# Patient Record
Sex: MALE | Race: WHITE
[De-identification: names, ages, dates, MRNs, and addresses within clinical notes are randomized per-mention and may not be internally consistent; named-entity substitution may affect disease eponyms.]

---

## 2018-01-17 ENCOUNTER — HOSPITAL ENCOUNTER (INPATIENT)
Dept: HOSPITAL 88 - ER | Age: 63
LOS: 1 days | Discharge: HOME | DRG: 181 | End: 2018-01-18
Attending: INTERNAL MEDICINE | Admitting: INTERNAL MEDICINE
Payer: COMMERCIAL

## 2018-01-17 VITALS — SYSTOLIC BLOOD PRESSURE: 124 MMHG | DIASTOLIC BLOOD PRESSURE: 77 MMHG

## 2018-01-17 VITALS — DIASTOLIC BLOOD PRESSURE: 90 MMHG | SYSTOLIC BLOOD PRESSURE: 147 MMHG

## 2018-01-17 VITALS — DIASTOLIC BLOOD PRESSURE: 71 MMHG | SYSTOLIC BLOOD PRESSURE: 135 MMHG

## 2018-01-17 VITALS — BODY MASS INDEX: 23.98 KG/M2 | WEIGHT: 177 LBS | HEIGHT: 72 IN

## 2018-01-17 VITALS — DIASTOLIC BLOOD PRESSURE: 91 MMHG | SYSTOLIC BLOOD PRESSURE: 155 MMHG

## 2018-01-17 VITALS — SYSTOLIC BLOOD PRESSURE: 155 MMHG | DIASTOLIC BLOOD PRESSURE: 91 MMHG

## 2018-01-17 DIAGNOSIS — C09.9: ICD-10-CM

## 2018-01-17 DIAGNOSIS — C78.02: Primary | ICD-10-CM

## 2018-01-17 DIAGNOSIS — C79.31: ICD-10-CM

## 2018-01-17 DIAGNOSIS — Z87.891: ICD-10-CM

## 2018-01-17 DIAGNOSIS — C79.51: ICD-10-CM

## 2018-01-17 DIAGNOSIS — C78.01: ICD-10-CM

## 2018-01-17 DIAGNOSIS — J90: ICD-10-CM

## 2018-01-17 LAB
ALBUMIN SERPL-MCNC: 2.8 G/DL (ref 3.5–5)
ALBUMIN/GLOB SERPL: 0.7 {RATIO} (ref 0.8–2)
ALP SERPL-CCNC: 136 IU/L (ref 40–150)
ALT SERPL-CCNC: 38 IU/L (ref 0–55)
ANION GAP SERPL CALC-SCNC: 13.9 MMOL/L (ref 8–16)
BACTERIA URNS QL MICRO: (no result) /HPF
BASOPHILS # BLD AUTO: 0 10*3/UL (ref 0–0.1)
BASOPHILS NFR BLD AUTO: 0.3 % (ref 0–1)
BILIRUB UR QL: NEGATIVE
BNP BLD-MCNC: 115 PG/ML (ref 0–100)
BUN SERPL-MCNC: 19 MG/DL (ref 7–26)
BUN/CREAT SERPL: 28 (ref 6–25)
CALCIUM SERPL-MCNC: 11.6 MG/DL (ref 8.4–10.2)
CHLORIDE SERPL-SCNC: 86 MMOL/L (ref 98–107)
CK MB SERPL-MCNC: 1.1 NG/ML (ref 0–5)
CK MB SERPL-MCNC: 2.1 NG/ML (ref 0–5)
CK MB SERPL-MCNC: 2.2 NG/ML (ref 0–5)
CK SERPL-CCNC: 237 IU/L (ref 30–200)
CK SERPL-CCNC: 237 IU/L (ref 30–200)
CK SERPL-CCNC: 257 IU/L (ref 30–200)
CLARITY UR: (no result)
CO2 SERPL-SCNC: 30 MMOL/L (ref 22–29)
COLOR UR: YELLOW
DEPRECATED APTT PLAS QN: 32.2 SECONDS (ref 23.8–35.5)
DEPRECATED INR PLAS: 0.94
DEPRECATED NEUTROPHILS # BLD AUTO: 9.7 10*3/UL (ref 2.1–6.9)
DEPRECATED RBC URNS MANUAL-ACNC: (no result) /HPF (ref 0–5)
EGFRCR SERPLBLD CKD-EPI 2021: > 60 ML/MIN (ref 60–?)
EOSINOPHIL # BLD AUTO: 0.1 10*3/UL (ref 0–0.4)
EOSINOPHIL NFR BLD AUTO: 0.7 % (ref 0–6)
EPI CELLS URNS QL MICRO: (no result) /LPF
ERYTHROCYTE [DISTWIDTH] IN CORD BLOOD: 15.7 % (ref 11.7–14.4)
GLOBULIN PLAS-MCNC: 4.1 G/DL (ref 2.3–3.5)
GLUCOSE SERPLBLD-MCNC: 110 MG/DL (ref 74–118)
HCT VFR BLD AUTO: 32.9 % (ref 38.2–49.6)
HGB BLD-MCNC: 10.8 G/DL (ref 14–18)
KETONES UR QL STRIP.AUTO: NEGATIVE
LEUKOCYTE ESTERASE UR QL STRIP.AUTO: NEGATIVE
LIPASE SERPL-CCNC: 180 U/L (ref 8–78)
LYMPHOCYTES # BLD: 0.5 10*3/UL (ref 1–3.2)
LYMPHOCYTES NFR BLD AUTO: 4.6 % (ref 18–39.1)
MCH RBC QN AUTO: 30.9 PG (ref 28–32)
MCHC RBC AUTO-ENTMCNC: 32.8 G/DL (ref 31–35)
MCV RBC AUTO: 94.3 FL (ref 81–99)
MONOCYTES # BLD AUTO: 1.1 10*3/UL (ref 0.2–0.8)
MONOCYTES NFR BLD AUTO: 9.5 % (ref 4.4–11.3)
NEUTS SEG NFR BLD AUTO: 84.2 % (ref 38.7–80)
NITRITE UR QL STRIP.AUTO: NEGATIVE
PLATELET # BLD AUTO: 181 X10E3/UL (ref 140–360)
POTASSIUM SERPL-SCNC: 3.9 MMOL/L (ref 3.5–5.1)
PROT UR QL STRIP.AUTO: (no result)
PROTHROMBIN TIME: 13 SECONDS (ref 11.9–14.5)
RBC # BLD AUTO: 3.49 X10E6/UL (ref 4.3–5.7)
SODIUM SERPL-SCNC: 126 MMOL/L (ref 136–145)
SP GR UR STRIP: 1.02 (ref 1.01–1.02)
TROPONIN I SERPL DL<=0.01 NG/ML-MCNC: 0.01 NG/ML (ref 0–0.3)
TROPONIN I SERPL DL<=0.01 NG/ML-MCNC: 0.02 NG/ML (ref 0–0.3)
TROPONIN I SERPL DL<=0.01 NG/ML-MCNC: 0.02 NG/ML (ref 0–0.3)
UROBILINOGEN UR STRIP-MCNC: 0.2 MG/DL (ref 0.2–1)
WBC #/AREA URNS HPF: (no result) /HPF (ref 0–5)

## 2018-01-17 PROCEDURE — 94644 CONT INHLJ TX 1ST HOUR: CPT

## 2018-01-17 PROCEDURE — 87070 CULTURE OTHR SPECIMN AEROBIC: CPT

## 2018-01-17 PROCEDURE — 83880 ASSAY OF NATRIURETIC PEPTIDE: CPT

## 2018-01-17 PROCEDURE — 80053 COMPREHEN METABOLIC PANEL: CPT

## 2018-01-17 PROCEDURE — 81001 URINALYSIS AUTO W/SCOPE: CPT

## 2018-01-17 PROCEDURE — 84484 ASSAY OF TROPONIN QUANT: CPT

## 2018-01-17 PROCEDURE — 93005 ELECTROCARDIOGRAM TRACING: CPT

## 2018-01-17 PROCEDURE — 87400 INFLUENZA A/B EACH AG IA: CPT

## 2018-01-17 PROCEDURE — 99285 EMERGENCY DEPT VISIT HI MDM: CPT

## 2018-01-17 PROCEDURE — 87086 URINE CULTURE/COLONY COUNT: CPT

## 2018-01-17 PROCEDURE — 94640 AIRWAY INHALATION TREATMENT: CPT

## 2018-01-17 PROCEDURE — 83690 ASSAY OF LIPASE: CPT

## 2018-01-17 PROCEDURE — 85025 COMPLETE CBC W/AUTO DIFF WBC: CPT

## 2018-01-17 PROCEDURE — 83605 ASSAY OF LACTIC ACID: CPT

## 2018-01-17 PROCEDURE — 84157 ASSAY OF PROTEIN OTHER: CPT

## 2018-01-17 PROCEDURE — 80048 BASIC METABOLIC PNL TOTAL CA: CPT

## 2018-01-17 PROCEDURE — 74470 X-RAY EXAM OF KIDNEY LESION: CPT

## 2018-01-17 PROCEDURE — 82945 GLUCOSE OTHER FLUID: CPT

## 2018-01-17 PROCEDURE — 71010: CPT

## 2018-01-17 PROCEDURE — 88112 CYTOPATH CELL ENHANCE TECH: CPT

## 2018-01-17 PROCEDURE — 32555 ASPIRATE PLEURA W/ IMAGING: CPT

## 2018-01-17 PROCEDURE — 87205 SMEAR GRAM STAIN: CPT

## 2018-01-17 PROCEDURE — 85730 THROMBOPLASTIN TIME PARTIAL: CPT

## 2018-01-17 PROCEDURE — 88305 TISSUE EXAM BY PATHOLOGIST: CPT

## 2018-01-17 PROCEDURE — 71260 CT THORAX DX C+: CPT

## 2018-01-17 PROCEDURE — 85610 PROTHROMBIN TIME: CPT

## 2018-01-17 PROCEDURE — 36415 COLL VENOUS BLD VENIPUNCTURE: CPT

## 2018-01-17 PROCEDURE — 82550 ASSAY OF CK (CPK): CPT

## 2018-01-17 PROCEDURE — 83615 LACTATE (LD) (LDH) ENZYME: CPT

## 2018-01-17 PROCEDURE — 89051 BODY FLUID CELL COUNT: CPT

## 2018-01-17 PROCEDURE — 82553 CREATINE MB FRACTION: CPT

## 2018-01-17 RX ADMIN — METOCLOPRAMIDE SCH MG: 10 TABLET ORAL at 22:31

## 2018-01-17 RX ADMIN — IPRATROPIUM BROMIDE AND ALBUTEROL SULFATE SCH ML: .5; 2.5 SOLUTION RESPIRATORY (INHALATION) at 23:45

## 2018-01-17 RX ADMIN — SODIUM CHLORIDE SCH GM: 9 INJECTION, SOLUTION INTRAVENOUS at 19:33

## 2018-01-17 RX ADMIN — METOCLOPRAMIDE SCH MG: 10 TABLET ORAL at 14:32

## 2018-01-17 RX ADMIN — DEXAMETHASONE SCH MG: 4 TABLET ORAL at 10:33

## 2018-01-17 RX ADMIN — Medication SCH MG: at 15:06

## 2018-01-17 RX ADMIN — Medication PRN MG: at 09:20

## 2018-01-17 RX ADMIN — Medication SCH MG: at 20:04

## 2018-01-17 RX ADMIN — MORPHINE SULFATE PRN MG: 15 TABLET ORAL at 20:04

## 2018-01-17 RX ADMIN — IPRATROPIUM BROMIDE AND ALBUTEROL SULFATE SCH ML: .5; 2.5 SOLUTION RESPIRATORY (INHALATION) at 10:55

## 2018-01-17 RX ADMIN — IPRATROPIUM BROMIDE AND ALBUTEROL SULFATE SCH ML: .5; 2.5 SOLUTION RESPIRATORY (INHALATION) at 07:30

## 2018-01-17 RX ADMIN — IPRATROPIUM BROMIDE AND ALBUTEROL SULFATE SCH ML: .5; 2.5 SOLUTION RESPIRATORY (INHALATION) at 15:00

## 2018-01-17 RX ADMIN — IPRATROPIUM BROMIDE AND ALBUTEROL SULFATE SCH ML: .5; 2.5 SOLUTION RESPIRATORY (INHALATION) at 19:23

## 2018-01-17 RX ADMIN — MORPHINE SULFATE PRN MG: 15 TABLET ORAL at 15:06

## 2018-01-17 RX ADMIN — Medication PRN MG: at 16:46

## 2018-01-17 NOTE — CONSULTATION
DATE OF CONSULTATION:  January 17, 2018 



HEMATOLOGY/ONCOLOGY CONSULTATION



Consultation requested by Dr. Borjas and Dr. Rodrigues.



REASON FOR CONSULTATION:  Metastatic malignancy.



Thank you very kindly, Dr. Borjas and Dr. Rodrigues, for letting me 

participate in the care of this 62-year-old male. He was a rather poor 

historian. Dr. Rodrigues was kind enough to provide me details that he 

initially was diagnosed with carcinoma of the tonsillar area which was 

resected. He declined to have postsurgical treatments. Subsequently he 

relapsed rather quickly with bilateral pulmonary nodules and pleural 

effusion. Additionally, it appears that he also had brain and skeletal 

mets. He has been under the care of Dr. Sherri Kothari at [facility] in the [facility]. He had only received radiation, according to 

the patient, and was to start on some systemic therapy this week but became 

short of breath and came over to the emergency room and was admitted to the 

hospital. There was no history of fever. System review is positive for 

cough, shortness of breath, weakness, back pain. Additional details of the 

history are documented in the old chart.



PHYSICAL EXAMINATION

GENERAL:  Revealed an acutely and chronically ill-looking male.

VITAL SIGNS:  As recorded in the chart.

HEENT:  Conjunctivae are a little pale. There is no icterus.

NECK:  There is no palpable peripheral adenopathy. 

LUNGS:  Examination reveals prolonged expiration, decreased air entry in 

the right lower zones.

HEART:  Size appears within normal limits.

ABDOMEN:  Without visceromegaly. 

NEUROLOGIC:  No focal neurological deficit is noted.



LABORATORY STUDIES:  Reveal a mild degree of anemia. Serum sodium is low at 

126. Renal function is normal. 



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 

tonsillar. 



Current problems include shortness of breath which may be improved with 

thoracentesis on the right side. This was discussed with the patient and 

with Dr. Rodrigues, who was in agreement. The patient previously had 

thoracentesis on the left side with relief of shortness of breath. 

Subsequently he wants to return back to his regular oncologist for further 

followup care.





 





DD:  01/17/2018 13:43

DT:  01/17/2018 15:41

Job#:  [id number]

## 2018-01-17 NOTE — DIAGNOSTIC IMAGING REPORT
EXAMINATION:  CHEST SINGLE (PORTABLE)    



INDICATION:           Shortness of breath 



COMPARISON:  None

     

FINDINGS:

TUBES and LINES:  Right upper extremity PICC line with tip overlying the atrial

caval junction



LUNGS:  Lungs are not well inflated.  There are bibasilar atelectasis.  

Confluent areas of nodular opacities compatible with metastasis of

indeterminate origin. Superimposed pneumonia is suspected



PLEURA:  No pleural effusion or pneumothorax.



HEART AND MEDIASTINUM:  The cardiomediastinal silhouette is unremarkable.    



BONES AND SOFT TISSUES:  No acute osseous lesion.  Soft tissues are

unremarkable.



UPPER ABDOMEN: No free air under the diaphragm.    



IMPRESSION: 

Findings in the chest are suspicious for metastatic disease with superimposed

infection. CT of chest with IV contrast is recommended





Signed by: Dr. Toby Mejía M.D. on 1/17/2018 1:08 AM

## 2018-01-17 NOTE — DIAGNOSTIC IMAGING REPORT
EXAM: CT Chest WITH contrast 1/17/2018 12:43 AM

INDICATION: Difficulty breathing 

COMPARISON: Chest x-ray on 1/17/2018

TECHNIQUE:

Chest was scanned utilizing a multidetector helical scanner from the lung apex

through the level of the adrenal glands without administration of IV contrast.

Coronal and sagittal reformations were obtained. Pulmonary embolism protocol

was performed.

           IV CONTRAST: 100 mL of Isovue-370

           RADIATION DOSE: Total DLP: 590.26 mGy*cm

            Estimated effective dose: (DLP x 0.014 x size factor) mSv

           COMPLICATIONS: None



FINDINGS:



LINES/ TUBES: None.



LUNGS AND AIRWAYS:  Innumerable bilateral pulmonary nodules and confluent

masses, the largest in the right hilar region measuring 7.6 cm in diameter on

series 2, image 59. Airways are normal.



PLEURA: Moderate right pleural effusion.



HEART AND MEDIASTINUM: The thyroid gland is normal.  The lymphadenopathy noted

in the subcarinal, prevascular and bilateral hilar regions.  The heart is

normal in size.. There is a small pericardial effusion.  There are mild

atherosclerotic calcifications in the aorta and coronary arteries.



UPPER ABDOMEN: Limited non-contrast views of the upper abdomen show no

abnormality within the visualized liver, spleen, pancreas, or kidneys. The

adrenal glands are normal.



BONES: There are multiple lytic lesions in the spine involving predominantly T5

and T11



SOFT TISSUES: Unremarkable.



IMPRESSION: 

1.  Metastatic disease involving the bilateral lung parenchyma, hilar and

mediastinal adenopathy and T5 and T11 bone metastasis. Biopsy is recommended.





Signed by: Dr. Toby Mejía M.D. on 1/17/2018 3:40 AM
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
## 2018-01-18 VITALS — SYSTOLIC BLOOD PRESSURE: 113 MMHG | DIASTOLIC BLOOD PRESSURE: 81 MMHG

## 2018-01-18 VITALS — DIASTOLIC BLOOD PRESSURE: 86 MMHG | SYSTOLIC BLOOD PRESSURE: 135 MMHG

## 2018-01-18 VITALS — SYSTOLIC BLOOD PRESSURE: 118 MMHG | DIASTOLIC BLOOD PRESSURE: 78 MMHG

## 2018-01-18 VITALS — SYSTOLIC BLOOD PRESSURE: 156 MMHG | DIASTOLIC BLOOD PRESSURE: 82 MMHG

## 2018-01-18 VITALS — SYSTOLIC BLOOD PRESSURE: 143 MMHG | DIASTOLIC BLOOD PRESSURE: 83 MMHG

## 2018-01-18 VITALS — DIASTOLIC BLOOD PRESSURE: 93 MMHG | SYSTOLIC BLOOD PRESSURE: 161 MMHG

## 2018-01-18 LAB
ANION GAP SERPL CALC-SCNC: 12.5 MMOL/L (ref 8–16)
APPEARANCE FLD: (no result)
BASOPHILS # BLD AUTO: 0 10*3/UL (ref 0–0.1)
BASOPHILS NFR BLD AUTO: 0.2 % (ref 0–1)
BUN SERPL-MCNC: 22 MG/DL (ref 7–26)
BUN/CREAT SERPL: 37 (ref 6–25)
CALCIUM SERPL-MCNC: 11.4 MG/DL (ref 8.4–10.2)
CHLORIDE SERPL-SCNC: 93 MMOL/L (ref 98–107)
CO2 SERPL-SCNC: 33 MMOL/L (ref 22–29)
COLOR FLD: YELLOW
DEPRECATED LYMPHOCYTES FR FLD MANUAL: 10 %
DEPRECATED NEUTROPHILS # BLD AUTO: 8.4 10*3/UL (ref 2.1–6.9)
EGFRCR SERPLBLD CKD-EPI 2021: > 60 ML/MIN (ref 60–?)
EOSINOPHIL # BLD AUTO: 0.1 10*3/UL (ref 0–0.4)
EOSINOPHIL NFR BLD AUTO: 1.2 % (ref 0–6)
EOSINOPHIL NFR BLD MANUAL: 1 % (ref 0–7)
ERYTHROCYTE [DISTWIDTH] IN CORD BLOOD: 15.7 % (ref 11.7–14.4)
GLUCOSE FLD-MCNC: 75 MG/DL
GLUCOSE SERPLBLD-MCNC: 82 MG/DL (ref 74–118)
HCT VFR BLD AUTO: 29.4 % (ref 38.2–49.6)
HGB BLD-MCNC: 9.5 G/DL (ref 14–18)
LYMPHOCYTES # BLD: 0.3 10*3/UL (ref 1–3.2)
LYMPHOCYTES NFR BLD AUTO: 2.7 % (ref 18–39.1)
LYMPHOCYTES NFR BLD MANUAL: 2 % (ref 19–48)
MCH RBC QN AUTO: 30.6 PG (ref 28–32)
MCHC RBC AUTO-ENTMCNC: 32.3 G/DL (ref 31–35)
MCV RBC AUTO: 94.8 FL (ref 81–99)
MONOCYTES # BLD AUTO: 0.9 10*3/UL (ref 0.2–0.8)
MONOCYTES NFR BLD AUTO: 9.3 % (ref 4.4–11.3)
MONOCYTES NFR BLD MANUAL: 5 % (ref 3.4–9)
MONOS+MACROS NFR FLD MANUAL: 16 %
NEUTROPHILS NFR FLD MANUAL: 65 %
NEUTS SEG NFR BLD AUTO: 85.3 % (ref 38.7–80)
NEUTS SEG NFR BLD MANUAL: 91 % (ref 40–74)
OTHER CELLS FLD MANUAL: 9 %
PLAT MORPH BLD: NORMAL
PLATELET # BLD AUTO: 173 X10E3/UL (ref 140–360)
PLATELET # BLD EST: ADEQUATE 10*3/UL
POTASSIUM SERPL-SCNC: 4.5 MMOL/L (ref 3.5–5.1)
RBC # BLD AUTO: 3.1 X10E6/UL (ref 4.3–5.7)
RBC MORPH BLD: NORMAL
SODIUM SERPL-SCNC: 134 MMOL/L (ref 136–145)
SPECIMEN SOURCE FLD: (no result)
WBC # FLD MANUAL: (no result) CELLS/UL

## 2018-01-18 PROCEDURE — 0W993ZZ DRAINAGE OF RIGHT PLEURAL CAVITY, PERCUTANEOUS APPROACH: ICD-10-PCS

## 2018-01-18 RX ADMIN — METOCLOPRAMIDE SCH MG: 10 TABLET ORAL at 06:00

## 2018-01-18 RX ADMIN — MORPHINE SULFATE PRN MG: 15 TABLET ORAL at 03:05

## 2018-01-18 RX ADMIN — Medication PRN MG: at 12:35

## 2018-01-18 RX ADMIN — IPRATROPIUM BROMIDE AND ALBUTEROL SULFATE SCH ML: .5; 2.5 SOLUTION RESPIRATORY (INHALATION) at 10:46

## 2018-01-18 RX ADMIN — DEXAMETHASONE SCH MG: 4 TABLET ORAL at 08:26

## 2018-01-18 RX ADMIN — SODIUM CHLORIDE SCH GM: 9 INJECTION, SOLUTION INTRAVENOUS at 06:08

## 2018-01-18 RX ADMIN — IPRATROPIUM BROMIDE AND ALBUTEROL SULFATE SCH ML: .5; 2.5 SOLUTION RESPIRATORY (INHALATION) at 07:00

## 2018-01-18 RX ADMIN — Medication PRN MG: at 04:30

## 2018-01-18 RX ADMIN — Medication SCH MG: at 08:26

## 2018-01-18 RX ADMIN — Medication PRN MG: at 08:25

## 2018-01-18 RX ADMIN — Medication PRN MG: at 00:03

## 2018-01-18 RX ADMIN — IPRATROPIUM BROMIDE AND ALBUTEROL SULFATE SCH ML: .5; 2.5 SOLUTION RESPIRATORY (INHALATION) at 03:20

## 2018-01-18 NOTE — DIAGNOSTIC IMAGING REPORT
Ultrasound-guided Thoracentesis January 18, 2018



Pre-Procedure Diagnosis: Metastatic cancer; right pleural effusion

Post-procedure Diagnosis:Metastatic cancer; right pleural effusion



: VANDANA Segal

Assistant: None

Sedation: None. 1% lidocaine local anesthesia.



Estimate blood loss: <5 mL Blood administered: None

Complications: None

Implants/Grafts: None

Specimen: 1100 mL serosanguineous fluid





Procedure:

Informed consent was obtained and the patient positioned in a sitting position

in the ultrasound suite. A timeout was performed, followed by preliminary

ultrasound of the chest. The back was prepped and draped in standard sterile

fashion.



Using real-time ultrasound guidance a 5-Austrian one-step centesis needle was

advanced into the right thoracic cavity. An image was stored in the electronic

medical record. 1700 mL serosanguineous fluid was aspirated. At the end of the

procedure the catheter was removed and a sterile dressing applied. The patient

tolerated the procedure well. No complications.



Findings:

Moderate volume right effusion.



Impression:

Successful ultrasound-guided right thoracentesis with removal of 1700 mL of

fluid. Samples were submitted for evaluation if requested by the referring

clinician.







This report was generated with voice-recognition technology. Errors in

transcription can occur. Please interpret accordingly and contact a radiologist

if there are any questions regarding the report.



Signed by: Dr. Maximo Segal M.D. on 1/18/2018 10:26 AM

## 2018-01-18 NOTE — DIAGNOSTIC IMAGING REPORT
Ultrasound-guided Thoracentesis January 18, 2018



Pre-Procedure Diagnosis: Metastatic cancer; right pleural effusion

Post-procedure Diagnosis:Metastatic cancer; right pleural effusion



: VANDANA Segal

Assistant: None

Sedation: None. 1% lidocaine local anesthesia.



Estimate blood loss: <5 mL Blood administered: None

Complications: None

Implants/Grafts: None

Specimen: 1100 mL serosanguineous fluid





Procedure:

Informed consent was obtained and the patient positioned in a sitting position

in the ultrasound suite. A timeout was performed, followed by preliminary

ultrasound of the chest. The back was prepped and draped in standard sterile

fashion.



Using real-time ultrasound guidance a 5-Kuwaiti one-step centesis needle was

advanced into the right thoracic cavity. An image was stored in the electronic

medical record. 1700 mL serosanguineous fluid was aspirated. At the end of the

procedure the catheter was removed and a sterile dressing applied. The patient

tolerated the procedure well. No complications.



Findings:

Moderate volume right effusion.



Impression:

Successful ultrasound-guided right thoracentesis with removal of 1700 mL of

fluid. Samples were submitted for evaluation if requested by the referring

clinician.







This report was generated with voice-recognition technology. Errors in

transcription can occur. Please interpret accordingly and contact a radiologist

if there are any questions regarding the report.



Signed by: Dr. Maximo Segal M.D. on 1/18/2018 10:26 AM

## 2018-01-18 NOTE — DIAGNOSTIC IMAGING REPORT
PROCEDURE:CHEST XRAY POST PROCEDURE

TECHNIQUE:Upright PA chest totaling 2 radiographs

INDICATION:Post right thoracentesis

COMPARISON:Patients [facility], DX, CHEST SINGLE (PORTABLE), 

1/17/2018, 0:39.

 

FINDINGS:

See conclusion.

 

CONCLUSION:

1. Near-complete evacuation of right pleural effusion status post 

thoracentesis. No pneumothorax.

2. Right PICC terminating in the low SVC.

3. Bilateral pulmonary nodules consistent with metastasis.

4. Bilateral hilar enlargement consistent with mediastinal 

lymphadenopathy.

5. Stable cardiomediastinal silhouette, with normal heart size.

6. Small left pleural effusion.

 

 

Dictated by:  Maximo Segal M.D. on 1/18/2018 at 10:37     

Electronically approved by:  Maximo Segal M.D. on 1/18/2018 at 

10:37

## 2018-01-18 NOTE — DISCHARGE SUMMARY
Mr. Hirsch is a 62-year-old man with history of hypertension, lung cancer 

status post radiotherapy stage IV with bone metastasis, came to the 

emergency room complaining of shortness of breath.  He was found to have a 

right pleural effusion.  He was seen by pulmonologist and oncologist.  He 

is going to go for a thoracentesis today.  After that, family wants him to 

be discharged home and have him follow up at [facility] with his 

oncologist.



PHYSICAL EXAMINATION

GENERAL:  Awake and alert.

VITAL SIGNS:  Temperature is 97.1.

HEART:  Regular rate.

LUNGS:  Decreased breath sounds.

ABDOMEN:  Soft.



LAB WOK:  Potassium 4.5, creatinine 0.59, and glucose 82.  White count 

9.81, hemoglobin 9.5, and hematocrit 29.4.  Chest CT showed metastatic 

disease involving the bilateral lung parenchyma, hilar and mediastinal 

adenopathy and bone metastasis.



DISCHARGE DIAGNOSES

1. Squamous cell carcinoma of the lung with metastasis stage IV, status 

post radiation.

2. Right pleural effusion, for thoracentesis today.





PLAN:  At the present time if after he had the procedure done he is stable 

and chest x-ray does not show any complications, they are willing to go 

home.  Dr. Dallas recommended him to follow with his oncologist as soon as 

possible and he discussed risks and benefits with patient and family.  

Please see home medication reconciliation list.





 _________________________________

BONY ALONSO MD



DD:  01/18/2018 09:56

DT:  01/18/2018 12:34

Job#:  [id number]

## 2018-01-21 ENCOUNTER — HOSPITAL ENCOUNTER (INPATIENT)
Dept: HOSPITAL 88 - ER | Age: 63
LOS: 3 days | Discharge: HOSPICE | DRG: 180 | End: 2018-01-24
Attending: INTERNAL MEDICINE | Admitting: INTERNAL MEDICINE
Payer: COMMERCIAL

## 2018-01-21 VITALS — SYSTOLIC BLOOD PRESSURE: 130 MMHG | DIASTOLIC BLOOD PRESSURE: 85 MMHG

## 2018-01-21 VITALS — SYSTOLIC BLOOD PRESSURE: 136 MMHG | DIASTOLIC BLOOD PRESSURE: 89 MMHG

## 2018-01-21 VITALS — WEIGHT: 190 LBS | BODY MASS INDEX: 25.73 KG/M2 | HEIGHT: 72 IN

## 2018-01-21 VITALS — DIASTOLIC BLOOD PRESSURE: 68 MMHG | SYSTOLIC BLOOD PRESSURE: 110 MMHG

## 2018-01-21 VITALS — DIASTOLIC BLOOD PRESSURE: 85 MMHG | SYSTOLIC BLOOD PRESSURE: 146 MMHG

## 2018-01-21 DIAGNOSIS — C79.31: ICD-10-CM

## 2018-01-21 DIAGNOSIS — C79.51: ICD-10-CM

## 2018-01-21 DIAGNOSIS — E83.52: ICD-10-CM

## 2018-01-21 DIAGNOSIS — J90: ICD-10-CM

## 2018-01-21 DIAGNOSIS — J18.9: ICD-10-CM

## 2018-01-21 DIAGNOSIS — C34.90: Primary | ICD-10-CM

## 2018-01-21 DIAGNOSIS — J44.0: ICD-10-CM

## 2018-01-21 DIAGNOSIS — J96.01: ICD-10-CM

## 2018-01-21 DIAGNOSIS — Z87.891: ICD-10-CM

## 2018-01-21 DIAGNOSIS — Z66: ICD-10-CM

## 2018-01-21 LAB
ALBUMIN SERPL-MCNC: 2.5 G/DL (ref 3.5–5)
ALBUMIN/GLOB SERPL: 0.6 {RATIO} (ref 0.8–2)
ALP SERPL-CCNC: 136 IU/L (ref 40–150)
ALT SERPL-CCNC: 40 IU/L (ref 0–55)
ANION GAP SERPL CALC-SCNC: 15.1 MMOL/L (ref 8–16)
BACTERIA URNS QL MICRO: (no result) /HPF
BASE EXCESS BLDA CALC-SCNC: 13 MMOL/L (ref -2–3)
BASOPHILS # BLD AUTO: 0 10*3/UL (ref 0–0.1)
BASOPHILS NFR BLD AUTO: 0.4 % (ref 0–1)
BILIRUB UR QL: NEGATIVE
BNP BLD-MCNC: 30.1 PG/ML (ref 0–100)
BUN SERPL-MCNC: 23 MG/DL (ref 7–26)
BUN/CREAT SERPL: 32 (ref 6–25)
CALCIUM SERPL-MCNC: 12.4 MG/DL (ref 8.4–10.2)
CHLORIDE SERPL-SCNC: 92 MMOL/L (ref 98–107)
CK MB SERPL-MCNC: 0.5 NG/ML (ref 0–5)
CK MB SERPL-MCNC: 0.7 NG/ML (ref 0–5)
CK MB SERPL-MCNC: 0.7 NG/ML (ref 0–5)
CK SERPL-CCNC: 246 IU/L (ref 30–200)
CK SERPL-CCNC: 246 IU/L (ref 30–200)
CK SERPL-CCNC: 285 IU/L (ref 30–200)
CLARITY UR: CLEAR
CO2 SERPL-SCNC: 32 MMOL/L (ref 22–29)
COLOR UR: YELLOW
DEPRECATED APTT PLAS QN: 29.7 SECONDS (ref 23.8–35.5)
DEPRECATED INR PLAS: 0.9
DEPRECATED NEUTROPHILS # BLD AUTO: 9.7 10*3/UL (ref 2.1–6.9)
DEPRECATED RBC URNS MANUAL-ACNC: (no result) /HPF (ref 0–5)
EGFRCR SERPLBLD CKD-EPI 2021: > 60 ML/MIN (ref 60–?)
EOSINOPHIL # BLD AUTO: 0.1 10*3/UL (ref 0–0.4)
EOSINOPHIL NFR BLD AUTO: 0.4 % (ref 0–6)
EPI CELLS URNS QL MICRO: (no result) /LPF
ERYTHROCYTE [DISTWIDTH] IN CORD BLOOD: 15.2 % (ref 11.7–14.4)
GLOBULIN PLAS-MCNC: 4.2 G/DL (ref 2.3–3.5)
GLUCOSE SERPLBLD-MCNC: 104 MG/DL (ref 74–118)
HCO3 BLDA-SCNC: 38 MMOL/L (ref 23–28)
HCT VFR BLD AUTO: 34 % (ref 38.2–49.6)
HGB BLD-MCNC: 11 G/DL (ref 14–18)
KETONES UR QL STRIP.AUTO: NEGATIVE
LEUKOCYTE ESTERASE UR QL STRIP.AUTO: NEGATIVE
LYMPHOCYTES # BLD: 0.4 10*3/UL (ref 1–3.2)
LYMPHOCYTES NFR BLD AUTO: 3.2 % (ref 18–39.1)
MAGNESIUM SERPL-MCNC: 1.6 MG/DL (ref 1.3–2.1)
MCH RBC QN AUTO: 30.8 PG (ref 28–32)
MCHC RBC AUTO-ENTMCNC: 32.4 G/DL (ref 31–35)
MCV RBC AUTO: 95.2 FL (ref 81–99)
MONOCYTES # BLD AUTO: 1 10*3/UL (ref 0.2–0.8)
MONOCYTES NFR BLD AUTO: 9 % (ref 4.4–11.3)
NEUTS SEG NFR BLD AUTO: 85.5 % (ref 38.7–80)
NITRITE UR QL STRIP.AUTO: NEGATIVE
PCO2 BLDA: 57 MMHG (ref 41–51)
PCO2 BLDA: 84 MMHG (ref 80–105)
PH BLDA: 7.43 [PH] (ref 7.31–7.41)
PLATELET # BLD AUTO: 185 X10E3/UL (ref 140–360)
POTASSIUM SERPL-SCNC: 4.1 MMOL/L (ref 3.5–5.1)
PROT UR QL STRIP.AUTO: (no result)
PROTHROMBIN TIME: 12.6 SECONDS (ref 11.9–14.5)
RBC # BLD AUTO: 3.57 X10E6/UL (ref 4.3–5.7)
SAO2 % BLDA: 96 % (ref 95–98)
SODIUM SERPL-SCNC: 135 MMOL/L (ref 136–145)
SP GR UR STRIP: 1.02 (ref 1.01–1.02)
TROPONIN I SERPL DL<=0.01 NG/ML-MCNC: 0.01 NG/ML (ref 0–0.3)
UROBILINOGEN UR STRIP-MCNC: 0.2 MG/DL (ref 0.2–1)
WBC #/AREA URNS HPF: (no result) /HPF (ref 0–5)

## 2018-01-21 PROCEDURE — 71250 CT THORAX DX C-: CPT

## 2018-01-21 PROCEDURE — 36600 WITHDRAWAL OF ARTERIAL BLOOD: CPT

## 2018-01-21 PROCEDURE — 99284 EMERGENCY DEPT VISIT MOD MDM: CPT

## 2018-01-21 PROCEDURE — 96365 THER/PROPH/DIAG IV INF INIT: CPT

## 2018-01-21 PROCEDURE — 94660 CPAP INITIATION&MGMT: CPT

## 2018-01-21 PROCEDURE — 93005 ELECTROCARDIOGRAM TRACING: CPT

## 2018-01-21 PROCEDURE — 83615 LACTATE (LD) (LDH) ENZYME: CPT

## 2018-01-21 PROCEDURE — 85025 COMPLETE CBC W/AUTO DIFF WBC: CPT

## 2018-01-21 PROCEDURE — 80053 COMPREHEN METABOLIC PANEL: CPT

## 2018-01-21 PROCEDURE — 83735 ASSAY OF MAGNESIUM: CPT

## 2018-01-21 PROCEDURE — 88112 CYTOPATH CELL ENHANCE TECH: CPT

## 2018-01-21 PROCEDURE — 81001 URINALYSIS AUTO W/SCOPE: CPT

## 2018-01-21 PROCEDURE — 87040 BLOOD CULTURE FOR BACTERIA: CPT

## 2018-01-21 PROCEDURE — 85610 PROTHROMBIN TIME: CPT

## 2018-01-21 PROCEDURE — 84484 ASSAY OF TROPONIN QUANT: CPT

## 2018-01-21 PROCEDURE — 88305 TISSUE EXAM BY PATHOLOGIST: CPT

## 2018-01-21 PROCEDURE — 82805 BLOOD GASES W/O2 SATURATION: CPT

## 2018-01-21 PROCEDURE — 36415 COLL VENOUS BLD VENIPUNCTURE: CPT

## 2018-01-21 PROCEDURE — 82550 ASSAY OF CK (CPK): CPT

## 2018-01-21 PROCEDURE — 82553 CREATINE MB FRACTION: CPT

## 2018-01-21 PROCEDURE — 96376 TX/PRO/DX INJ SAME DRUG ADON: CPT

## 2018-01-21 PROCEDURE — 71010: CPT

## 2018-01-21 PROCEDURE — 84157 ASSAY OF PROTEIN OTHER: CPT

## 2018-01-21 PROCEDURE — 80048 BASIC METABOLIC PNL TOTAL CA: CPT

## 2018-01-21 PROCEDURE — 87400 INFLUENZA A/B EACH AG IA: CPT

## 2018-01-21 PROCEDURE — 74470 X-RAY EXAM OF KIDNEY LESION: CPT

## 2018-01-21 PROCEDURE — 87086 URINE CULTURE/COLONY COUNT: CPT

## 2018-01-21 PROCEDURE — 94640 AIRWAY INHALATION TREATMENT: CPT

## 2018-01-21 PROCEDURE — 89051 BODY FLUID CELL COUNT: CPT

## 2018-01-21 PROCEDURE — 87070 CULTURE OTHR SPECIMN AEROBIC: CPT

## 2018-01-21 PROCEDURE — 32555 ASPIRATE PLEURA W/ IMAGING: CPT

## 2018-01-21 PROCEDURE — 87205 SMEAR GRAM STAIN: CPT

## 2018-01-21 PROCEDURE — 83880 ASSAY OF NATRIURETIC PEPTIDE: CPT

## 2018-01-21 PROCEDURE — 96374 THER/PROPH/DIAG INJ IV PUSH: CPT

## 2018-01-21 PROCEDURE — 83605 ASSAY OF LACTIC ACID: CPT

## 2018-01-21 PROCEDURE — 85730 THROMBOPLASTIN TIME PARTIAL: CPT

## 2018-01-21 RX ADMIN — METHYLPREDNISOLONE SODIUM SUCCINATE SCH MG: 40 INJECTION, POWDER, LYOPHILIZED, FOR SOLUTION INTRAMUSCULAR; INTRAVENOUS at 15:55

## 2018-01-21 RX ADMIN — HYDROMORPHONE HYDROCHLORIDE PRN MG: 2 INJECTION INTRAMUSCULAR; INTRAVENOUS; SUBCUTANEOUS at 22:52

## 2018-01-21 RX ADMIN — Medication SCH ML: at 11:00

## 2018-01-21 RX ADMIN — Medication SCH ML: at 19:52

## 2018-01-21 RX ADMIN — Medication SCH ML: at 07:00

## 2018-01-21 RX ADMIN — Medication SCH ML: at 14:27

## 2018-01-21 RX ADMIN — TAZOBACTAM SODIUM AND PIPERACILLIN SODIUM SCH MLS/HR: 375; 3 INJECTION, SOLUTION INTRAVENOUS at 23:23

## 2018-01-21 RX ADMIN — LEVOFLOXACIN SCH MLS/HR: 5 INJECTION, SOLUTION INTRAVENOUS at 04:58

## 2018-01-21 RX ADMIN — ENOXAPARIN SODIUM SCH MG: 40 INJECTION SUBCUTANEOUS at 08:49

## 2018-01-21 RX ADMIN — TAZOBACTAM SODIUM AND PIPERACILLIN SODIUM SCH MLS/HR: 375; 3 INJECTION, SOLUTION INTRAVENOUS at 17:43

## 2018-01-21 RX ADMIN — SODIUM CHLORIDE SCH MLS/HR: 9 INJECTION, SOLUTION INTRAVENOUS at 15:55

## 2018-01-21 RX ADMIN — SODIUM CHLORIDE SCH MLS/HR: 9 INJECTION, SOLUTION INTRAVENOUS at 09:48

## 2018-01-21 RX ADMIN — HYDROMORPHONE HYDROCHLORIDE PRN MG: 2 INJECTION INTRAMUSCULAR; INTRAVENOUS; SUBCUTANEOUS at 09:39

## 2018-01-21 RX ADMIN — Medication SCH ML: at 14:26

## 2018-01-21 RX ADMIN — HYDROMORPHONE HYDROCHLORIDE PRN MG: 2 INJECTION INTRAMUSCULAR; INTRAVENOUS; SUBCUTANEOUS at 19:45

## 2018-01-21 RX ADMIN — HYDROMORPHONE HYDROCHLORIDE PRN MG: 2 INJECTION INTRAMUSCULAR; INTRAVENOUS; SUBCUTANEOUS at 16:19

## 2018-01-21 RX ADMIN — TAZOBACTAM SODIUM AND PIPERACILLIN SODIUM SCH MLS/HR: 375; 3 INJECTION, SOLUTION INTRAVENOUS at 13:03

## 2018-01-21 RX ADMIN — METHYLPREDNISOLONE SODIUM SUCCINATE SCH MG: 40 INJECTION, POWDER, LYOPHILIZED, FOR SOLUTION INTRAMUSCULAR; INTRAVENOUS at 23:22

## 2018-01-21 NOTE — CONSULTATION
DATE OF CONSULTATION:    



NO DICTATION, length 0 minutes 1 seconds. 

 



 





DD:  01/21/2018 13:48

DT:  01/21/2018 17:53

Job#:  [id number]

## 2018-01-21 NOTE — DIAGNOSTIC IMAGING REPORT
EXAM: CT Chest WITHOUT contrast

INDICATION:      

\S\abnomal cxr

\S\[id number]

\S\1620  

COMPARISON: Chest x-ray of the same date. And chest CT dated 1/17/2018



TECHNIQUE:

Chest was scanned utilizing a multidetector helical scanner from the lung apex

through the level of the adrenal glands without administration of IV contrast.

Absence of intravenous contrast decreases sensitivity for detection of

lymphadenopathy and vascular pathology. Coronal and sagittal reformations were

obtained. Routine protocol was performed.

     IV CONTRAST: None



COMPLICATIONS: None   



RADIATION DOSE: 

     Total DLP: 637.21 mGy*cm

     Estimated effective dose: (DLP x 0.014 x size factor) mSv

     CTDIvol has been reviewed. It is below the limits set by the Radiation

Protocol Committee (RPC).

           

FINDINGS:



LINES/ TUBES: Right PICC in place with tip terminating in inferior SVC.



LUNGS AND AIRWAYS:  Innumerable metastatic lung nodules throughout the lungs.

Pulmonary vascular congestion and interlobular septal thickening. Mild

interstitial edema.  Narrowing of the right main bronchus from subcarinal

lymphadenopathy.



PLEURA: Moderate right and small left pleural effusion. There is

lobulation/diffuse thickening and increased density of the left pleural lobe,

representing pleural metastatic disease.



HEART AND MEDIASTINUM: Markedly enlarged heterogeneous thyroid gland,

especially the left lobe. Metastatic mediastinal lymphadenopathy. For example a

2.6 cm prevascular lymph node.  No axillary lymphadenopathy. Ectatic ascending

thoracic aorta measuring 4 cm.  The heart is borderline enlarged. There is a

small pericardial effusion.  Atherosclerotic calcification of coronary

arteries.



UPPER ABDOMEN: Enlarged adrenal glands may represent hyperplasia or metastatic

involvement. Partially imaged 2.3 cm eddie hepatis lymph node, likely

metastatic (series 2, image 133).



BONES: Metastatic lateral right seventh rib lesion versus healing fracture.

There is also healing fracture of the posterior eighth rib. Metastatic lesion

of the lateral sixth rib (series 2, image 79). Metastatic expansile lesion of

the anterior right eighth rib (2/130). Metastatic lytic T11 and T5 vertebral

body lesions.



SOFT TISSUES: Unremarkable.



IMPRESSION:

Diffuse metastatic disease involvement of the lungs and pleura.

Moderate right and small left pleural effusion, likely malignant.

Metastatic upper abdominal lymphadenopathy, partially imaged.

Enlarged adrenal glands, likely metastatic involvement versus less likely

hyperplasia.

Metastatic mediastinal lymphadenopathy.

Small pericardial effusion.

Multiple ill-defined hepatic hypodensities, suspicious for metastatic disease.

Markedly enlarged heterogeneous left thyroid gland can be further evaluated

with ultrasound.

Mild pulmonary edema and interlobular septal thickening.



Signed by: Dr. Kris Molina MD on 1/21/2018 5:36 PM

## 2018-01-21 NOTE — HISTORY AND PHYSICAL
CHIEF COMPLAINT:  Shortness of breath, cough and wheezing that started 

yesterday.



HISTORY OF PRESENT ILLNESS:  Mr. Hirsch is a 62-year-old male who is a 

regular patient of Dr. Landeros. He was admitted to [facility] up until 18th of January under Dr. Borjas's service with the complaints of chest pain and 

shortness of breath and had right-sided pleural effusion. He underwent 

thoracentesis. Patient was continued on IV antibiotics, and Dr. Dallas 

evaluated the patient and after evaluation patient was discharged home. He 

presented back last night with worsening shortness of breath, cough and 

wheezing. He uses 5 liters of nasal cannula oxygen at home. He denies any 

chest pain, nausea, vomiting, diarrhea. He has received flu vaccine this 

year. Patient underwent flu testing on the 17th and it was negative. In the 

emergency room there was miscommunication with the ER doctor that patient 

wants to be a DNR; however, patient wants everything done and he is full 

code. His care is at [facility], and he is being treated for the 

squamous cell carcinoma of the lung which is metastatic.



REVIEW OF SYSTEMS

GENERAL:  Denies any fever or chills.

HEAD:  Denies any head trauma or head injury.

ENT:  Denies any earache, nosebleed, throat pain.

CVS:  Denies any chest pain.

RESPIRATORY:  Patient has shortness of breath and wheezing.

GI:  Denies any nausea or vomiting. 

REMAINDER:  The rest of the review of systems are negative except as in the 

HPI.



PAST MEDICAL HISTORY:  Metastatic squamous cell carcinoma of the lung. It 

initially started with tonsillar carcinoma in November of 2016. Patient 

underwent chemo and radiation. In November of 2017, they found that patient 

has a metastatic cancer including lung and bone mets along with 

lymphadenopathy. Patient received gamma knife surgery for brain mets as 

well. He is an ex-smoker. 



PAST SURGICAL HISTORY:  As above.





FAMILY AND SOCIAL HISTORY:  He is an ex-smoker, smoked for 20 years, 1 pack 

per day. Denies any alcohol use. Currently he is not working. Lives with 

his wife.



PHYSICAL EXAMINATION

VITAL SIGNS:  Temperature 97.8, pulse of 94, blood pressure 113/72, 

respiratory rate of 18, O2 sat 97% on 5 liters.

SKIN:  Warm and dry. 

GENERAL APPEARANCE:  He is a middle-aged male. He is in moderate 

respiratory distress. He is awake and alert, following commands, responding 

to questions appropriately.

HEENT:  Head atraumatic, normocephalic. Pupils are reactive.

NECK:  Supple. 

CHEST:  Rhonchi and crackles bilaterally. 

HEART:  S1/S2 audible. No murmurs, gallops or rubs.

ABDOMEN:  Soft, nontender, nondistended. 

EXTREMITIES:  No pedal edema.

NEUROLOGICALLY:  Awake and alert.



LABS:  Sodium 135, potassium 4.1, chloride 92, BUN 23, creatinine 0.7. 

Lactic acid was 16.7. Albumin 2.5. Creatine kinase 246. White count of 

11,000, was 9,000 when he was discharged. Hemoglobin 11.0. Platelets 185. 



CHEST X-RAY:  I have reviewed the images, and since patient has metastatic 

disease it is showing left lower lobe infiltrate, multilobar infiltrate and 

nodules. I have compared the film to the one that was done on the 18th and 

does not seem much of a change except pleural effusion is smaller.



ASSESSMENT:   Mr. Hirsch is a 62-year-old male who has metastatic squamous 

cell cancer, started with tonsillar cancer, has metastatic disease to lung 

and spine. Presented with worsening shortness of breath, cough and 

wheezing. 



CURRENT PROBLEMS

1. Metastatic squamous cell carcinoma with metastasis to brain, lungs 

and spine.

2. High likelihood of chronic obstructive pulmonary disease.

3. Currently wheezing and shortness of breath. Possibility of pneumonia 

as patient has leukocytosis and wheezing, crackles. Chest x-ray has 

multilobar infiltrate which seems unchanged and possibly is due to 

metastatic disease. However, pneumonia cannot be ruled out.



PLAN

1. Oxygen as needed and BiPAP as needed. Transfer the patient to [facility]. 

2. IV Zosyn.

3. IV Solu-Medrol.

4. Nebulizer treatment.

5. Lovenox subcutaneous for DVT prophylaxis.

6. Detailed discussion was carried out with patient and patient's wife 

at bedside about end-of-life care, and they want full resuscitation and 

they want to be full code. Will change the code status. 

7. I will do a CT chest without contrast. 











DD:  01/21/2018 13:56

DT:  01/21/2018 15:03

Job#:  [id number]

## 2018-01-21 NOTE — DIAGNOSTIC IMAGING REPORT
EXAMINATION:  CHEST SINGLE (PORTABLE)    



INDICATION:           Shortness of breath 



COMPARISON:  1/18/2018 and 1/17/2018

     

FINDINGS:

TUBES and LINES:  Right upper extremity PICC line with tip overlying the atrial

caval junction



LUNGS:  Lungs are not well inflated.  There are bibasilar atelectasis.  

Confluent areas of nodular opacities compatible with metastasis. Superimposed

pneumonia or pulmonary edema are suspected



PLEURA:  No pleural effusion or pneumothorax.



HEART AND MEDIASTINUM:  The cardiomediastinal silhouette is unremarkable.    



BONES AND SOFT TISSUES:  No acute osseous lesion.  Soft tissues are

unremarkable.



UPPER ABDOMEN: No free air under the diaphragm.    



IMPRESSION: 

Findings in the chest are suspicious for metastatic disease with superimposed

infection or edema. 



Signed by: Dr. Toby Mejía M.D. on 1/21/2018 3:18 AM

## 2018-01-22 VITALS — SYSTOLIC BLOOD PRESSURE: 136 MMHG | DIASTOLIC BLOOD PRESSURE: 89 MMHG

## 2018-01-22 VITALS — SYSTOLIC BLOOD PRESSURE: 138 MMHG | DIASTOLIC BLOOD PRESSURE: 90 MMHG

## 2018-01-22 VITALS — SYSTOLIC BLOOD PRESSURE: 124 MMHG | DIASTOLIC BLOOD PRESSURE: 71 MMHG

## 2018-01-22 VITALS — SYSTOLIC BLOOD PRESSURE: 154 MMHG | DIASTOLIC BLOOD PRESSURE: 90 MMHG

## 2018-01-22 VITALS — DIASTOLIC BLOOD PRESSURE: 82 MMHG | SYSTOLIC BLOOD PRESSURE: 133 MMHG

## 2018-01-22 VITALS — DIASTOLIC BLOOD PRESSURE: 74 MMHG | SYSTOLIC BLOOD PRESSURE: 130 MMHG

## 2018-01-22 VITALS — SYSTOLIC BLOOD PRESSURE: 142 MMHG | DIASTOLIC BLOOD PRESSURE: 79 MMHG

## 2018-01-22 LAB
ALBUMIN SERPL-MCNC: 2.1 G/DL (ref 3.5–5)
ALBUMIN/GLOB SERPL: 0.6 {RATIO} (ref 0.8–2)
ALP SERPL-CCNC: 112 IU/L (ref 40–150)
ALT SERPL-CCNC: 27 IU/L (ref 0–55)
ANION GAP SERPL CALC-SCNC: 12.5 MMOL/L (ref 8–16)
BASOPHILS # BLD AUTO: 0 10*3/UL (ref 0–0.1)
BASOPHILS NFR BLD AUTO: 0.2 % (ref 0–1)
BUN SERPL-MCNC: 22 MG/DL (ref 7–26)
BUN/CREAT SERPL: 29 (ref 6–25)
CALCIUM SERPL-MCNC: 12.2 MG/DL (ref 8.4–10.2)
CHLORIDE SERPL-SCNC: 98 MMOL/L (ref 98–107)
CK MB SERPL-MCNC: 0.4 NG/ML (ref 0–5)
CK SERPL-CCNC: 240 IU/L (ref 30–200)
CO2 SERPL-SCNC: 33 MMOL/L (ref 22–29)
DEPRECATED NEUTROPHILS # BLD AUTO: 8.9 10*3/UL (ref 2.1–6.9)
EGFRCR SERPLBLD CKD-EPI 2021: > 60 ML/MIN (ref 60–?)
EOSINOPHIL # BLD AUTO: 0 10*3/UL (ref 0–0.4)
EOSINOPHIL NFR BLD AUTO: 0 % (ref 0–6)
ERYTHROCYTE [DISTWIDTH] IN CORD BLOOD: 15 % (ref 11.7–14.4)
GLOBULIN PLAS-MCNC: 3.6 G/DL (ref 2.3–3.5)
GLUCOSE SERPLBLD-MCNC: 130 MG/DL (ref 74–118)
HCT VFR BLD AUTO: 30.8 % (ref 38.2–49.6)
HGB BLD-MCNC: 9.7 G/DL (ref 14–18)
LYMPHOCYTES # BLD: 0.2 10*3/UL (ref 1–3.2)
LYMPHOCYTES NFR BLD AUTO: 1.7 % (ref 18–39.1)
MCH RBC QN AUTO: 30.5 PG (ref 28–32)
MCHC RBC AUTO-ENTMCNC: 31.5 G/DL (ref 31–35)
MCV RBC AUTO: 96.9 FL (ref 81–99)
MONOCYTES # BLD AUTO: 0.4 10*3/UL (ref 0.2–0.8)
MONOCYTES NFR BLD AUTO: 4.4 % (ref 4.4–11.3)
NEUTS SEG NFR BLD AUTO: 92.3 % (ref 38.7–80)
PLATELET # BLD AUTO: 152 X10E3/UL (ref 140–360)
POTASSIUM SERPL-SCNC: 4.5 MMOL/L (ref 3.5–5.1)
RBC # BLD AUTO: 3.18 X10E6/UL (ref 4.3–5.7)
SODIUM SERPL-SCNC: 139 MMOL/L (ref 136–145)
TROPONIN I SERPL DL<=0.01 NG/ML-MCNC: 0.01 NG/ML (ref 0–0.3)

## 2018-01-22 RX ADMIN — METHYLPREDNISOLONE SODIUM SUCCINATE SCH MG: 40 INJECTION, POWDER, LYOPHILIZED, FOR SOLUTION INTRAMUSCULAR; INTRAVENOUS at 14:57

## 2018-01-22 RX ADMIN — ENOXAPARIN SODIUM SCH MG: 40 INJECTION SUBCUTANEOUS at 17:57

## 2018-01-22 RX ADMIN — Medication SCH ML: at 00:10

## 2018-01-22 RX ADMIN — METOCLOPRAMIDE SCH MG: 10 TABLET ORAL at 15:11

## 2018-01-22 RX ADMIN — Medication SCH MG: at 15:11

## 2018-01-22 RX ADMIN — MORPHINE SULFATE PRN MG: 15 TABLET ORAL at 15:11

## 2018-01-22 RX ADMIN — Medication SCH ML: at 19:30

## 2018-01-22 RX ADMIN — METHYLPREDNISOLONE SODIUM SUCCINATE SCH MG: 40 INJECTION, POWDER, LYOPHILIZED, FOR SOLUTION INTRAMUSCULAR; INTRAVENOUS at 06:22

## 2018-01-22 RX ADMIN — HYDROMORPHONE HYDROCHLORIDE PRN MG: 2 INJECTION INTRAMUSCULAR; INTRAVENOUS; SUBCUTANEOUS at 08:39

## 2018-01-22 RX ADMIN — TAZOBACTAM SODIUM AND PIPERACILLIN SODIUM SCH MLS/HR: 375; 3 INJECTION, SOLUTION INTRAVENOUS at 17:59

## 2018-01-22 RX ADMIN — Medication SCH ML: at 07:20

## 2018-01-22 RX ADMIN — HYDROMORPHONE HYDROCHLORIDE PRN MG: 2 INJECTION INTRAMUSCULAR; INTRAVENOUS; SUBCUTANEOUS at 21:56

## 2018-01-22 RX ADMIN — HYDROMORPHONE HYDROCHLORIDE PRN MG: 2 INJECTION INTRAMUSCULAR; INTRAVENOUS; SUBCUTANEOUS at 16:52

## 2018-01-22 RX ADMIN — Medication SCH ML: at 10:45

## 2018-01-22 RX ADMIN — Medication SCH MG: at 21:55

## 2018-01-22 RX ADMIN — Medication SCH ML: at 14:25

## 2018-01-22 RX ADMIN — LEVOFLOXACIN SCH MLS/HR: 5 INJECTION, SOLUTION INTRAVENOUS at 04:31

## 2018-01-22 RX ADMIN — METOCLOPRAMIDE SCH MG: 10 TABLET ORAL at 21:55

## 2018-01-22 RX ADMIN — HYDROMORPHONE HYDROCHLORIDE PRN MG: 2 INJECTION INTRAMUSCULAR; INTRAVENOUS; SUBCUTANEOUS at 13:08

## 2018-01-22 RX ADMIN — Medication SCH ML: at 23:20

## 2018-01-22 RX ADMIN — TAZOBACTAM SODIUM AND PIPERACILLIN SODIUM SCH MLS/HR: 375; 3 INJECTION, SOLUTION INTRAVENOUS at 12:15

## 2018-01-22 RX ADMIN — TAZOBACTAM SODIUM AND PIPERACILLIN SODIUM SCH MLS/HR: 375; 3 INJECTION, SOLUTION INTRAVENOUS at 23:40

## 2018-01-22 RX ADMIN — Medication SCH ML: at 03:37

## 2018-01-22 RX ADMIN — METHYLPREDNISOLONE SODIUM SUCCINATE SCH MG: 40 INJECTION, POWDER, LYOPHILIZED, FOR SOLUTION INTRAMUSCULAR; INTRAVENOUS at 21:55

## 2018-01-22 RX ADMIN — SODIUM CHLORIDE SCH MLS/HR: 9 INJECTION, SOLUTION INTRAVENOUS at 01:20

## 2018-01-22 RX ADMIN — TAZOBACTAM SODIUM AND PIPERACILLIN SODIUM SCH MLS/HR: 375; 3 INJECTION, SOLUTION INTRAVENOUS at 06:22

## 2018-01-22 RX ADMIN — SODIUM CHLORIDE SCH MLS/HR: 9 INJECTION, SOLUTION INTRAVENOUS at 12:15

## 2018-01-23 VITALS — DIASTOLIC BLOOD PRESSURE: 82 MMHG | SYSTOLIC BLOOD PRESSURE: 153 MMHG

## 2018-01-23 VITALS — SYSTOLIC BLOOD PRESSURE: 140 MMHG | DIASTOLIC BLOOD PRESSURE: 87 MMHG

## 2018-01-23 VITALS — DIASTOLIC BLOOD PRESSURE: 91 MMHG | SYSTOLIC BLOOD PRESSURE: 152 MMHG

## 2018-01-23 VITALS — SYSTOLIC BLOOD PRESSURE: 134 MMHG | DIASTOLIC BLOOD PRESSURE: 80 MMHG

## 2018-01-23 VITALS — SYSTOLIC BLOOD PRESSURE: 154 MMHG | DIASTOLIC BLOOD PRESSURE: 78 MMHG

## 2018-01-23 LAB
ANION GAP SERPL CALC-SCNC: 12.4 MMOL/L (ref 8–16)
APPEARANCE FLD: (no result)
BASOPHILS # BLD AUTO: 0 10*3/UL (ref 0–0.1)
BASOPHILS NFR BLD AUTO: 0.1 % (ref 0–1)
BUN SERPL-MCNC: 24 MG/DL (ref 7–26)
BUN/CREAT SERPL: 33 (ref 6–25)
CALCIUM SERPL-MCNC: 11.7 MG/DL (ref 8.4–10.2)
CHLORIDE SERPL-SCNC: 94 MMOL/L (ref 98–107)
CO2 SERPL-SCNC: 36 MMOL/L (ref 22–29)
COLOR FLD: (no result)
DEPRECATED LYMPHOCYTES FR FLD MANUAL: 13 %
DEPRECATED NEUTROPHILS # BLD AUTO: 10.9 10*3/UL (ref 2.1–6.9)
EGFRCR SERPLBLD CKD-EPI 2021: > 60 ML/MIN (ref 60–?)
EOSINOPHIL # BLD AUTO: 0 10*3/UL (ref 0–0.4)
EOSINOPHIL NFR BLD AUTO: 0 % (ref 0–6)
EOSINOPHIL NFR FLD: 2 %
ERYTHROCYTE [DISTWIDTH] IN CORD BLOOD: 14.9 % (ref 11.7–14.4)
GLUCOSE SERPLBLD-MCNC: 107 MG/DL (ref 74–118)
HCT VFR BLD AUTO: 28.9 % (ref 38.2–49.6)
HGB BLD-MCNC: 9.2 G/DL (ref 14–18)
LYMPHOCYTES # BLD: 0.2 10*3/UL (ref 1–3.2)
LYMPHOCYTES NFR BLD AUTO: 1.7 % (ref 18–39.1)
MCH RBC QN AUTO: 30.7 PG (ref 28–32)
MCHC RBC AUTO-ENTMCNC: 31.8 G/DL (ref 31–35)
MCV RBC AUTO: 96.3 FL (ref 81–99)
MONOCYTES # BLD AUTO: 0.7 10*3/UL (ref 0.2–0.8)
MONOCYTES NFR BLD AUTO: 5.7 % (ref 4.4–11.3)
MONOS+MACROS NFR FLD MANUAL: 9 %
NEUTROPHILS NFR FLD MANUAL: 76 %
NEUTS SEG NFR BLD AUTO: 91.5 % (ref 38.7–80)
PLATELET # BLD AUTO: 179 X10E3/UL (ref 140–360)
POTASSIUM SERPL-SCNC: 4.4 MMOL/L (ref 3.5–5.1)
RBC # BLD AUTO: 3 X10E6/UL (ref 4.3–5.7)
SODIUM SERPL-SCNC: 138 MMOL/L (ref 136–145)
SPECIMEN SOURCE FLD: (no result)
WBC # FLD MANUAL: 39 CELLS/UL

## 2018-01-23 PROCEDURE — 0W993ZX DRAINAGE OF RIGHT PLEURAL CAVITY, PERCUTANEOUS APPROACH, DIAGNOSTIC: ICD-10-PCS

## 2018-01-23 RX ADMIN — TAZOBACTAM SODIUM AND PIPERACILLIN SODIUM SCH MLS/HR: 375; 3 INJECTION, SOLUTION INTRAVENOUS at 11:58

## 2018-01-23 RX ADMIN — TAZOBACTAM SODIUM AND PIPERACILLIN SODIUM SCH MLS/HR: 375; 3 INJECTION, SOLUTION INTRAVENOUS at 05:21

## 2018-01-23 RX ADMIN — Medication SCH ML: at 03:35

## 2018-01-23 RX ADMIN — LEVOFLOXACIN SCH MLS/HR: 5 INJECTION, SOLUTION INTRAVENOUS at 03:37

## 2018-01-23 RX ADMIN — Medication SCH MG: at 15:21

## 2018-01-23 RX ADMIN — SODIUM CHLORIDE SCH MLS/HR: 9 INJECTION, SOLUTION INTRAVENOUS at 09:04

## 2018-01-23 RX ADMIN — HYDROMORPHONE HYDROCHLORIDE PRN MG: 2 INJECTION INTRAMUSCULAR; INTRAVENOUS; SUBCUTANEOUS at 21:55

## 2018-01-23 RX ADMIN — POLYETHYLENE GLYCOL 3350 SCH GM: 17 POWDER, FOR SOLUTION ORAL at 08:53

## 2018-01-23 RX ADMIN — CITALOPRAM HYDROBROMIDE SCH MG: 20 TABLET, FILM COATED ORAL at 08:52

## 2018-01-23 RX ADMIN — METOCLOPRAMIDE SCH MG: 10 TABLET ORAL at 15:21

## 2018-01-23 RX ADMIN — HYDROMORPHONE HYDROCHLORIDE PRN MG: 2 INJECTION INTRAMUSCULAR; INTRAVENOUS; SUBCUTANEOUS at 12:53

## 2018-01-23 RX ADMIN — Medication SCH ML: at 14:55

## 2018-01-23 RX ADMIN — Medication SCH ML: at 19:10

## 2018-01-23 RX ADMIN — METOCLOPRAMIDE SCH MG: 10 TABLET ORAL at 14:03

## 2018-01-23 RX ADMIN — Medication SCH MG: at 08:53

## 2018-01-23 RX ADMIN — HYDROMORPHONE HYDROCHLORIDE PRN MG: 2 INJECTION INTRAMUSCULAR; INTRAVENOUS; SUBCUTANEOUS at 06:56

## 2018-01-23 RX ADMIN — TAZOBACTAM SODIUM AND PIPERACILLIN SODIUM SCH MLS/HR: 375; 3 INJECTION, SOLUTION INTRAVENOUS at 23:32

## 2018-01-23 RX ADMIN — METHYLPREDNISOLONE SODIUM SUCCINATE SCH MG: 40 INJECTION, POWDER, LYOPHILIZED, FOR SOLUTION INTRAMUSCULAR; INTRAVENOUS at 15:00

## 2018-01-23 RX ADMIN — HYDROMORPHONE HYDROCHLORIDE PRN MG: 2 INJECTION INTRAMUSCULAR; INTRAVENOUS; SUBCUTANEOUS at 10:00

## 2018-01-23 RX ADMIN — METOCLOPRAMIDE SCH MG: 10 TABLET ORAL at 08:53

## 2018-01-23 RX ADMIN — HYDROMORPHONE HYDROCHLORIDE PRN MG: 2 INJECTION INTRAMUSCULAR; INTRAVENOUS; SUBCUTANEOUS at 02:42

## 2018-01-23 RX ADMIN — Medication SCH ML: at 11:12

## 2018-01-23 RX ADMIN — TAZOBACTAM SODIUM AND PIPERACILLIN SODIUM SCH MLS/HR: 375; 3 INJECTION, SOLUTION INTRAVENOUS at 17:17

## 2018-01-23 RX ADMIN — Medication SCH ML: at 07:06

## 2018-01-23 RX ADMIN — Medication SCH MG: at 14:03

## 2018-01-23 RX ADMIN — ENOXAPARIN SODIUM SCH MG: 40 INJECTION SUBCUTANEOUS at 17:17

## 2018-01-23 RX ADMIN — METOCLOPRAMIDE SCH MG: 10 TABLET ORAL at 20:41

## 2018-01-23 RX ADMIN — METHYLPREDNISOLONE SODIUM SUCCINATE SCH MG: 40 INJECTION, POWDER, LYOPHILIZED, FOR SOLUTION INTRAMUSCULAR; INTRAVENOUS at 21:02

## 2018-01-23 RX ADMIN — SODIUM CHLORIDE SCH MLS/HR: 9 INJECTION, SOLUTION INTRAVENOUS at 08:15

## 2018-01-23 RX ADMIN — METHYLPREDNISOLONE SODIUM SUCCINATE SCH MG: 40 INJECTION, POWDER, LYOPHILIZED, FOR SOLUTION INTRAMUSCULAR; INTRAVENOUS at 05:21

## 2018-01-23 RX ADMIN — TRIAMTERENE AND HYDROCHLOROTHIAZIDE SCH EA: 37.5; 25 TABLET ORAL at 08:52

## 2018-01-23 RX ADMIN — MORPHINE SULFATE PRN MG: 15 TABLET ORAL at 15:22

## 2018-01-23 RX ADMIN — Medication SCH MG: at 20:41

## 2018-01-23 NOTE — DIAGNOSTIC IMAGING REPORT
PROCEDURE:

A single AP view of the chest.

 

COMPARISON: chest x-ray 1/21/2018 at 2:59 AM. CT chest 1/21/2018

 

INDICATIONS:   S/P THORACENTESIS

     

FINDINGS:

Lines/tubes: Right PICC line with tip at mid SVC.

 

Lungs: Lungs are moderately inflated especially the right lung. 

Multifocal air space opacity is unchanged.

 

Pleura: Trace right pleural effusion. Small left pleural effusion. No 

pneumothorax.

 

Heart and mediastinum:  The heart and the mediastinum are unremarkable.

 

Bones:  No acute bony abnormality.

 

IMPRESSION: 

 

1. No pneumothorax status post right thoracentesis.

2. Trace right and small left pleural effusions. No pneumothorax.

3. Diffuse metastatic disease is unchanged. 

 

Dictated by:  Chong Brito M.D. on 1/23/2018 at 17:33     

Electronically approved by:  Chong Brito M.D. on 1/23/2018 at 17:33

## 2018-01-24 VITALS — DIASTOLIC BLOOD PRESSURE: 86 MMHG | SYSTOLIC BLOOD PRESSURE: 144 MMHG

## 2018-01-24 VITALS — SYSTOLIC BLOOD PRESSURE: 145 MMHG | DIASTOLIC BLOOD PRESSURE: 98 MMHG

## 2018-01-24 VITALS — DIASTOLIC BLOOD PRESSURE: 90 MMHG | SYSTOLIC BLOOD PRESSURE: 142 MMHG

## 2018-01-24 VITALS — SYSTOLIC BLOOD PRESSURE: 144 MMHG | DIASTOLIC BLOOD PRESSURE: 83 MMHG

## 2018-01-24 RX ADMIN — HYDROMORPHONE HYDROCHLORIDE PRN MG: 2 INJECTION INTRAMUSCULAR; INTRAVENOUS; SUBCUTANEOUS at 19:31

## 2018-01-24 RX ADMIN — HYDROMORPHONE HYDROCHLORIDE PRN MG: 2 INJECTION INTRAMUSCULAR; INTRAVENOUS; SUBCUTANEOUS at 09:47

## 2018-01-24 RX ADMIN — Medication SCH ML: at 03:00

## 2018-01-24 RX ADMIN — Medication SCH ML: at 00:15

## 2018-01-24 RX ADMIN — HYDROMORPHONE HYDROCHLORIDE PRN MG: 2 INJECTION INTRAMUSCULAR; INTRAVENOUS; SUBCUTANEOUS at 11:19

## 2018-01-24 RX ADMIN — Medication SCH ML: at 15:20

## 2018-01-24 RX ADMIN — HYDROMORPHONE HYDROCHLORIDE PRN MG: 2 INJECTION INTRAMUSCULAR; INTRAVENOUS; SUBCUTANEOUS at 17:05

## 2018-01-24 RX ADMIN — METHYLPREDNISOLONE SODIUM SUCCINATE SCH MG: 40 INJECTION, POWDER, LYOPHILIZED, FOR SOLUTION INTRAMUSCULAR; INTRAVENOUS at 14:45

## 2018-01-24 RX ADMIN — CITALOPRAM HYDROBROMIDE SCH MG: 20 TABLET, FILM COATED ORAL at 09:45

## 2018-01-24 RX ADMIN — METOCLOPRAMIDE SCH MG: 10 TABLET ORAL at 09:47

## 2018-01-24 RX ADMIN — Medication SCH ML: at 11:40

## 2018-01-24 RX ADMIN — METOCLOPRAMIDE SCH MG: 10 TABLET ORAL at 14:49

## 2018-01-24 RX ADMIN — LEVOFLOXACIN SCH MLS/HR: 5 INJECTION, SOLUTION INTRAVENOUS at 03:46

## 2018-01-24 RX ADMIN — Medication SCH ML: at 06:45

## 2018-01-24 RX ADMIN — Medication SCH MG: at 14:49

## 2018-01-24 RX ADMIN — TAZOBACTAM SODIUM AND PIPERACILLIN SODIUM SCH MLS/HR: 375; 3 INJECTION, SOLUTION INTRAVENOUS at 17:12

## 2018-01-24 RX ADMIN — ENOXAPARIN SODIUM SCH MG: 40 INJECTION SUBCUTANEOUS at 17:12

## 2018-01-24 RX ADMIN — TRIAMTERENE AND HYDROCHLOROTHIAZIDE SCH EA: 37.5; 25 TABLET ORAL at 09:45

## 2018-01-24 RX ADMIN — POLYETHYLENE GLYCOL 3350 SCH GM: 17 POWDER, FOR SOLUTION ORAL at 09:45

## 2018-01-24 RX ADMIN — Medication SCH MG: at 09:47

## 2018-01-24 RX ADMIN — MORPHINE SULFATE PRN MG: 15 TABLET ORAL at 07:37

## 2018-01-24 RX ADMIN — METHYLPREDNISOLONE SODIUM SUCCINATE SCH MG: 40 INJECTION, POWDER, LYOPHILIZED, FOR SOLUTION INTRAMUSCULAR; INTRAVENOUS at 04:58

## 2018-01-24 RX ADMIN — TAZOBACTAM SODIUM AND PIPERACILLIN SODIUM SCH MLS/HR: 375; 3 INJECTION, SOLUTION INTRAVENOUS at 13:18

## 2018-01-24 RX ADMIN — HYDROMORPHONE HYDROCHLORIDE PRN MG: 2 INJECTION INTRAMUSCULAR; INTRAVENOUS; SUBCUTANEOUS at 01:15

## 2018-01-24 RX ADMIN — MORPHINE SULFATE PRN MG: 15 TABLET ORAL at 15:55

## 2018-01-24 RX ADMIN — HYDROMORPHONE HYDROCHLORIDE PRN MG: 2 INJECTION INTRAMUSCULAR; INTRAVENOUS; SUBCUTANEOUS at 13:22

## 2018-01-24 RX ADMIN — TAZOBACTAM SODIUM AND PIPERACILLIN SODIUM SCH MLS/HR: 375; 3 INJECTION, SOLUTION INTRAVENOUS at 04:58

## 2018-01-24 RX ADMIN — HYDROMORPHONE HYDROCHLORIDE PRN MG: 2 INJECTION INTRAMUSCULAR; INTRAVENOUS; SUBCUTANEOUS at 03:45

## 2018-01-24 RX ADMIN — HYDROMORPHONE HYDROCHLORIDE PRN MG: 2 INJECTION INTRAMUSCULAR; INTRAVENOUS; SUBCUTANEOUS at 06:30

## 2018-01-24 NOTE — PROGRESS NOTE
DATE:    



I reviewed the chart, discussed the case with the patient's nurse, and also 

his wife.  His main issues today are pain and restlessness, as he did not 

get his long-acting morphine last night.  He completed the infusion of 

pamidronate.  Did not have any labs drawn today.  I will check his calcium 

in the morning.  I had a lengthy discussion yesterday with his oncologist 

at [facility], Dr. Nubia Murillo.  She felt that there was not much 

more that [place] could offer with a transfer, and hence declined the 

transfer.  She recommended that he be managed with supportive care and also 

suggested to speak to the family about making him a DNR in terms of not 

having intubation and chest compressions.  I conveyed the summary of our 

conversation to Mrs. Hirsch that he has really advanced disease with poor 

outlook at this point and is not a candidate for treatment unless his 

functional status improves substantially.  The supportive care can be 

performed here just like it would be at [facility] and hence, he 

could not be transferred.  I also advised her to make him a DNR with no 

intubation and no chest compressions.  She was agreeable with it and is 

going to talk with the patient to get him to sign the papers.  I have 

notified Dr. Moses and Dr. Borjas of these discussions. 









DD:  01/24/2018 08:59

DT:  01/24/2018 10:43

Job#:  [id number]

## 2018-01-24 NOTE — DISCHARGE SUMMARY
FINAL DIAGNOSES 

1. Metastatic lung cancer with metastases to bones, pleural involvement, 

pleural effusion, extensive metastatic disease.

2. Recurrent pleural effusion.

3. History of smoking.

4. History of brain metastases status post Gamma Knife surgery.



ADMISSION HISTORY AND HOSPITAL COURSE:  Mr. Hirsch is a 62-year-old male who 

had 2 recurrent admissions at [facility] with shortness of breath and pleural 

effusion.  He had 2 thoracentesis done.  The patient has extensive 

metastatic lung cancer and follows up at [facility].  The metastases are 

in lymph nodes, bones and brain.  At this time, he was admitted with cough 

and wheezing.  I started the patient on treatment with IV antibiotic for 

possible pneumonia.  Thoracentesis was done.  Dr. Dallas was consulted. He 

evaluated the patient and deemed the patient had very extremely poor 

prognosis with his condition.  He had a discussion with Dr. Brittney Kothari at 

[facility], who is his oncologist, and she has also told him 

that the patient has not many options, and she recommended DNR status.  The 

patient was made DNR.  Discussion was done with the family, and hospice was 

called.  The patient will be discharged to hospice with [facility].  

Detailed discussion was carried out with the patient and his wife at 

bedside.



 

 _________________________________

MAGDA ELIZALDE MD



DD:  01/24/2018 17:10

DT:  01/24/2018 17:12

Job#:  [id number]

## 2018-01-26 NOTE — DIAGNOSTIC IMAGING REPORT
PROCEDURE: ULTRASOUND GUIDED THORACENTESIS

COMPARISON: CT chest 1/21/2018.

INDICATIONS:Pleural Effusion

 

FINDINGS:

After informed consent was obtained, the patient was placed in the 

sitting position and preliminary ultrasound of the posterior chest 

identified a safe route into the right pleural effusion. The overlying 

skin was prepped and draped in usual sterile fashion. Lidocaine 1% was 

used for local anesthesia.

 

Under ultrasound guidance, a centesis needle was advanced into the 

pleural fluid and 1450 cc were aspirated. The patient tolerated the 

procedure well and there were no immediate post-procedural 

complications. A post-thoracentesis chest radiograph will be obtained.

 

CONCLUSION:

Uncomplicated ultrasound-guided right thoracentesis with removal of 

1450 cc. 

 

Dictated by:  Mike Tobar M.D. on 1/26/2018 at 13:05     

Electronically approved by:  Mike Tobar M.D. on 1/26/2018 at 13:05